# Patient Record
Sex: FEMALE | Race: WHITE | Employment: UNEMPLOYED | URBAN - METROPOLITAN AREA
[De-identification: names, ages, dates, MRNs, and addresses within clinical notes are randomized per-mention and may not be internally consistent; named-entity substitution may affect disease eponyms.]

---

## 2018-02-18 ENCOUNTER — HOSPITAL ENCOUNTER (OUTPATIENT)
Age: 43
Discharge: HOME | End: 2018-02-18
Attending: EMERGENCY MEDICINE
Payer: COMMERCIAL

## 2018-02-18 VITALS
SYSTOLIC BLOOD PRESSURE: 126 MMHG | HEART RATE: 77 BPM | WEIGHT: 125 LBS | RESPIRATION RATE: 18 BRPM | DIASTOLIC BLOOD PRESSURE: 74 MMHG | TEMPERATURE: 98 F | BODY MASS INDEX: 21.34 KG/M2 | HEIGHT: 64 IN | OXYGEN SATURATION: 100 %

## 2018-02-18 DIAGNOSIS — J02.9 ACUTE VIRAL PHARYNGITIS: Primary | ICD-10-CM

## 2018-02-18 LAB — S PYO AG THROAT QL: NEGATIVE

## 2018-02-18 PROCEDURE — 87430 STREP A AG IA: CPT

## 2018-02-18 PROCEDURE — 99203 OFFICE O/P NEW LOW 30 MIN: CPT

## 2018-02-18 PROCEDURE — 87081 CULTURE SCREEN ONLY: CPT | Performed by: EMERGENCY MEDICINE

## 2018-02-18 PROCEDURE — 99204 OFFICE O/P NEW MOD 45 MIN: CPT

## 2018-02-18 RX ORDER — MULTIVIT-MIN/IRON FUM/FOLIC AC 7.5 MG-4
1 TABLET ORAL DAILY
COMMUNITY

## 2018-02-18 NOTE — ED PROVIDER NOTES
Patient presents with:  Sore Throat      HPI:     Marlin Davis is a 38year old female who presents for evaluation of a chief complaint of sore throat.  Associated symptoms include a slight cough    Possible risk factors for infection patient's children discharge instructions for your condition today.     Follow Up with:  Brandie Negrete MD  [id number] [facility]  [phone]    In 3 days  if symptoms do not improve        José Luis Duran MD

## 2018-02-18 NOTE — ED INITIAL ASSESSMENT (HPI)
Pt presents to the IC with c/o a sore throat and her son was dx with strep throat last week. +nasal congestion. No fevers.

## 2023-01-08 ENCOUNTER — HOSPITAL ENCOUNTER (OUTPATIENT)
Age: 48
Discharge: HOME | End: 2023-01-08
Payer: COMMERCIAL

## 2023-01-08 VITALS
HEART RATE: 105 BPM | RESPIRATION RATE: 20 BRPM | OXYGEN SATURATION: 100 % | TEMPERATURE: 98 F | SYSTOLIC BLOOD PRESSURE: 147 MMHG | DIASTOLIC BLOOD PRESSURE: 90 MMHG

## 2023-01-08 DIAGNOSIS — J01.90 ACUTE SINUSITIS, RECURRENCE NOT SPECIFIED, UNSPECIFIED LOCATION: Primary | ICD-10-CM

## 2023-01-08 LAB — SARS-COV-2 RNA RESP QL NAA+PROBE: NOT DETECTED

## 2023-01-08 RX ORDER — AMOXICILLIN AND CLAVULANATE POTASSIUM 875; 125 MG/1; MG/1
1 TABLET, FILM COATED ORAL 2 TIMES DAILY
Qty: 20 TABLET | Refills: 0 | Status: SHIPPED | OUTPATIENT
Start: 2023-01-08 | End: 2023-01-18

## 2023-01-08 NOTE — ED INITIAL ASSESSMENT (HPI)
Patient states that for 8 days her sinuses have been painful 10/10. Patient states her teeth hurt, her ears are very painful. She also states that she is having eye discharge in the R eye as of today this morning.

## 2023-01-08 NOTE — DISCHARGE INSTRUCTIONS
Push fluids. Rest.  Tylenol or ibuprofen as needed for pain or fever. Take the Augmentin as prescribed. Follow-up with your doctor. Return for any concerns.